# Patient Record
Sex: MALE | Race: WHITE | NOT HISPANIC OR LATINO | Employment: OTHER | URBAN - METROPOLITAN AREA
[De-identification: names, ages, dates, MRNs, and addresses within clinical notes are randomized per-mention and may not be internally consistent; named-entity substitution may affect disease eponyms.]

---

## 2020-03-27 ENCOUNTER — TELEPHONE (OUTPATIENT)
Dept: NEUROLOGY | Facility: CLINIC | Age: 70
End: 2020-03-27

## 2020-04-01 ENCOUNTER — OFFICE VISIT (OUTPATIENT)
Dept: NEUROLOGY | Facility: CLINIC | Age: 70
End: 2020-04-01
Payer: MEDICARE

## 2020-04-01 DIAGNOSIS — R27.0 ATAXIA: ICD-10-CM

## 2020-04-01 DIAGNOSIS — R42 VERTIGO: ICD-10-CM

## 2020-04-01 PROBLEM — H81.4 CENTRAL POSITIONAL VERTIGO: Status: ACTIVE | Noted: 2020-04-01

## 2020-04-01 PROCEDURE — 99203 PR OFFICE/OUTPT VISIT, NEW, LEVL III, 30-44 MIN: ICD-10-PCS | Mod: 95,,, | Performed by: PSYCHIATRY & NEUROLOGY

## 2020-04-01 PROCEDURE — 99203 OFFICE O/P NEW LOW 30 MIN: CPT | Mod: 95,,, | Performed by: PSYCHIATRY & NEUROLOGY

## 2020-04-01 RX ORDER — SCOLOPAMINE TRANSDERMAL SYSTEM 1 MG/1
1 PATCH, EXTENDED RELEASE TRANSDERMAL
Qty: 10 PATCH | Refills: 0 | Status: SHIPPED | OUTPATIENT
Start: 2020-04-01

## 2020-04-01 NOTE — PROGRESS NOTES
Subjective:       Patient ID: Irineo Castrejon is a 70 y.o. male.    Chief Complaint:  No chief complaint on file.      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  He did well initially about 3 months later, developed progressive severe vertigo.  This is made worse by standing.  The patient denies ever having a stroke or any other acute symptoms referable to the onset of these events.  He does complain of postural change when going from sitting to standing.        Review of Systems  Review of Systems   Musculoskeletal: Positive for gait problem.   Neurological: Positive for dizziness and light-headedness.       Objective:      Neurologic Exam    Physical Exam      Assessment:        1. Ataxia    2. Central positional vertigo      Plan:     Trial of transdermal scopolamine she 3 days.  Patient to call after 1-2 weeks and is otherwise doing.

## 2020-04-01 NOTE — ASSESSMENT & PLAN NOTE
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  We going to give him a trial of transderm scopolamine to see if that helps.  It this does not to be efficacious, we will give him a trial of diazepam 2 5 mg t.i.d..  He needs to follow-up with ENT.  He will need his records from the previous hospital stay.

## 2020-04-01 NOTE — LETTER
April 1, 2020      Galindo Damico MD  [address] [facility] [place] [address]           [facility]  [facility]  [place] [address]  Phone: [phone]          Patient: Irineo Castrejon   MR Number: [id number]   YOB: 1950   Date of Visit: 4/1/2020       Dear Dr. Galindo Damico:    Thank you for referring Irineo Castrejon to me for evaluation. Attached you will find relevant portions of my assessment and plan of care.    If you have questions, please do not hesitate to call me. I look forward to following Irineo Castrejon along with you.    Sincerely,    Shahana Jose MD    Enclosure  CC:  No Recipients    If you would like to receive this communication electronically, please contact [email or web address] or [phone] to request more information on [facility] [employer] access.    For providers and/or their staff who would like to refer a patient to [facility], please contact us through our one-stop-shop provider referral line, [facility], at [phone].    If you feel you have received this communication in error or would no longer like to receive these types of communications, please [email or web address] [email or web address]

## 2020-04-02 ENCOUNTER — TELEPHONE (OUTPATIENT)
Dept: NEUROLOGY | Facility: CLINIC | Age: 70
End: 2020-04-02